# Patient Record
Sex: FEMALE | Race: WHITE | NOT HISPANIC OR LATINO | URBAN - METROPOLITAN AREA
[De-identification: names, ages, dates, MRNs, and addresses within clinical notes are randomized per-mention and may not be internally consistent; named-entity substitution may affect disease eponyms.]

---

## 2017-01-11 ENCOUNTER — EMERGENCY (EMERGENCY)
Facility: HOSPITAL | Age: 22
LOS: 1 days | Discharge: HOME | End: 2017-01-11
Attending: PEDIATRICS | Admitting: PEDIATRICS
Payer: COMMERCIAL

## 2017-01-11 VITALS
SYSTOLIC BLOOD PRESSURE: 122 MMHG | OXYGEN SATURATION: 95 % | TEMPERATURE: 98 F | HEART RATE: 84 BPM | RESPIRATION RATE: 18 BRPM | DIASTOLIC BLOOD PRESSURE: 76 MMHG

## 2017-01-11 VITALS
TEMPERATURE: 98 F | DIASTOLIC BLOOD PRESSURE: 82 MMHG | RESPIRATION RATE: 18 BRPM | SYSTOLIC BLOOD PRESSURE: 133 MMHG | HEART RATE: 78 BPM | OXYGEN SATURATION: 98 %
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
## 2017-01-11 DIAGNOSIS — R04.0 EPISTAXIS: ICD-10-CM
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
## 2017-01-11 DIAGNOSIS — S02.2XXA FRACTURE OF NASAL BONES, INITIAL ENCOUNTER FOR CLOSED FRACTURE: ICD-10-CM

## 2017-01-11 PROCEDURE — 99283 EMERGENCY DEPT VISIT LOW MDM: CPT | Mod: 25

## 2017-01-11 PROCEDURE — 99284 EMERGENCY DEPT VISIT MOD MDM: CPT

## 2017-01-11 RX ADMIN — Medication 1 TABLET(S): at 20:36

## 2017-01-11 NOTE — ED PROVIDER NOTE - CARE PLAN
Instructions for follow-up, activity and diet:	1. Flonase as needed. Take Augmentin 875mg 1 tab, twice daily for 5 days.   2. DO NOT BLOW YOUR NOSE! If you feel like you are going to sneeze, please try to do it with your mouth open. No physical activity or contact sports.   3. Flonase as needed (over the counter) for decongestion.  4. Follow up with plastics / ent (see referral)  5. Return to the emergency room in If you have brisk bleeding (a fast drip-drip), have purulent (pus-like) drainage, expel clots, and/or  begin to swallow blood ? If you have a temperature of more than 101.5 degrees at any point  ? Pain or nausea uncontrolled Principal Discharge DX:	Nasal bone fracture  Instructions for follow-up, activity and diet:	1. Flonase as needed. Take Augmentin 875mg 1 tab, twice daily for 5 days.   2. DO NOT BLOW YOUR NOSE! If you feel like you are going to sneeze, please try to do it with your mouth open. No physical activity or contact sports. No swimming. Do not use straw.   3. Flonase as needed (over the counter) for decongestion.  4. Follow up with plastics / ent (see referral)  5. Return to the emergency room in If you have brisk bleeding (a fast drip-drip), have purulent (pus-like) drainage, expel clots, and/or begin to swallow blood. If you have a temperature of more than 101.5 degrees at any point, Uncontrolled pain or nausea, new or worsening symptoms or any other concern.

## 2017-01-11 NOTE — ED PROVIDER NOTE - OBJECTIVE STATEMENT
22yoF BIB parents for nose pain and epistaxis s/p trauma. pt was playing basketball with boyfreind when nose contacted back of his head. Began bleeding primarily out of left nares. Pt did not fall, lose conciousness or have change in vision. Bleeding stopped after mintues with direct pressure and ice. Presented to ed for concern for bleeding and deformity. Denies difficulty breathing, n/v, prior head trauma, neck pain, focal numbness or weakness, jaw pain or dysphagia, chest pain,

## 2017-01-11 NOTE — ED PROVIDER NOTE - PLAN OF CARE
1. Flonase as needed. Take Augmentin 875mg 1 tab, twice daily for 5 days.   2. DO NOT BLOW YOUR NOSE! If you feel like you are going to sneeze, please try to do it with your mouth open. No physical activity or contact sports.   3. Flonase as needed (over the counter) for decongestion.  4. Follow up with plastics / ent (see referral)  5. Return to the emergency room in If you have brisk bleeding (a fast drip-drip), have purulent (pus-like) drainage, expel clots, and/or  begin to swallow blood ? If you have a temperature of more than 101.5 degrees at any point  ? Pain or nausea uncontrolled 1. Flonase as needed. Take Augmentin 875mg 1 tab, twice daily for 5 days.   2. DO NOT BLOW YOUR NOSE! If you feel like you are going to sneeze, please try to do it with your mouth open. No physical activity or contact sports. No swimming. Do not use straw.   3. Flonase as needed (over the counter) for decongestion.  4. Follow up with plastics / ent (see referral)  5. Return to the emergency room in If you have brisk bleeding (a fast drip-drip), have purulent (pus-like) drainage, expel clots, and/or begin to swallow blood. If you have a temperature of more than 101.5 degrees at any point, Uncontrolled pain or nausea, new or worsening symptoms or any other concern.

## 2017-01-11 NOTE — ED ADULT NURSE NOTE - OBJECTIVE STATEMENT
23 yo female presents to the ED from home c/o nose injury this evening. patient states she was playing basketball and boyfriend accidentally hit patient with the top of his head as he was "rising from a dribbling position." patient states she did not lose consciousness. Patient is AAOx4. lung sounds clear bilaterally, cap refill <3sec. left sided nose laceration with scant bleeding and dry blood around left nare. patient has no tenderness under eyes bilaterally and is able to breathe without difficulty through nose. patient denies chest pain, fever, chills, SOB, vision problems, HA. PERRL. speech is clear. VSS. MD at the bedside.

## 2017-01-11 NOTE — ED PROVIDER NOTE - PHYSICAL EXAMINATION
Well Appearing, Nontoxic, mild distress 2/2 pain;  no skull hematoma or abrasion, no periorbital tenderness, bl nasal bone ttp, deformity and swelling. jaw, maxillae stable, no loose dentition, minimal clotted nasal blood in left nares, nares patent and pt can breath with either nostril occluded. no nasal septal hematoma. PERRL 2mm, EOMI, no pain with eye movement, MMM without sign of trauma, No stridor, Neck supple;  RRR w/o m/g/r;   CTAB w/o w/r/r;   Abd soft, nt/nd, +bs, no guard., no cvat;  No edema;  No rash;  Awake, maeX4, normal strength/tone. ambulatory

## 2017-01-11 NOTE — ED PROVIDER NOTE - ATTENDING CONTRIBUTION TO CARE
22y F with nose injury while playing basketball. Ran into [employer] player. Injury to nose with epistaxis, spontaneously resolved. No blurred vision no LOC no vomiting.   Vital Signs Stable  Gen: well appearing, NAD  HEENT: no conjunctivitis, MMM, swelling to nasal bridge, non tender orbital floor, tenderness at brdige of nose symmetrically, more swellingt to R of bridge  No septal hematoma  Neck supple  Cardiac: regular rate rhythm, normal S1S2  Chest: CTA BL, no wheeze or crackles  Abdomen: normal BS, soft, NT  Extremity: no gross deformity, good perfusion  Skin: superficial laceration to bridge of nose  Neuro: grossly normal CN II - XII in tact, normal gait    AP 22y F with likely nasal bone fracture. Recommend ice, motrin, follow up ENT 3-5 days when swelling improves. No imaging required. Open wound on top of nose ? open fracture,- will cover with antibiotics.

## 2017-01-11 NOTE — ED PROVIDER NOTE - MEDICAL DECISION MAKING DETAILS
nasal fracture - breathign comfortably. laceration on noes. will treat with abx. plastics / ent fu. sinus precautions.